# Patient Record
Sex: MALE | Race: WHITE | NOT HISPANIC OR LATINO | URBAN - METROPOLITAN AREA
[De-identification: names, ages, dates, MRNs, and addresses within clinical notes are randomized per-mention and may not be internally consistent; named-entity substitution may affect disease eponyms.]

---

## 2024-03-21 ENCOUNTER — TELEMEDICINE (OUTPATIENT)
Dept: PRIMARY CARE | Facility: CLINIC | Age: 24
End: 2024-03-21
Payer: COMMERCIAL

## 2024-03-21 DIAGNOSIS — F41.8 SITUATIONAL ANXIETY: ICD-10-CM

## 2024-03-21 DIAGNOSIS — F41.9 ANXIETY: Primary | ICD-10-CM

## 2024-03-21 DIAGNOSIS — F41.0 PANIC ATTACK: ICD-10-CM

## 2024-03-21 PROCEDURE — 99442 PR PHYS/QHP TELEPHONE EVALUATION 11-20 MIN: CPT | Performed by: INTERNAL MEDICINE

## 2024-03-21 RX ORDER — HYDROXYZINE HYDROCHLORIDE 10 MG/1
TABLET, FILM COATED ORAL
Qty: 20 TABLET | Refills: 0 | Status: SHIPPED | OUTPATIENT
Start: 2024-03-21 | End: 2024-05-06 | Stop reason: SDUPTHER

## 2024-03-21 NOTE — PROGRESS NOTES
Subjective   Patient ID: Mina Noble is a 23 y.o. male who presents for No chief complaint on file..    HPI Patient initiated telehealth visit this visit was completed via telephone secondary to the restrictions of the COVID-19 pandemic all medical issues were addressed and discussed with patient patient was not otherwise examined if it was felt that the patient needed to be seen in the office that would have been referred to do so patient verbally consented to visit  Sick visit has been working with therapist regarding situational anxiety especially flying which has been especially troublesome for him he has been finding slightly less than usual for his employer but may figure flying more than 5 times per year mostly domestic may have longer flight to [place] no chest pain no shortness of breath has had panic attack on the plane before otherwise has been doing well no chest pain no shortness of breath     past medical history Osgood-Schlatter's disease    Medications none    Allergies no known drug allergies        Review of Systems    Objective   There were no vitals taken for this visit.    Physical Exam alert and oriented x 3 breathing unlabored not otherwise examined    Assessment/Plan impression situational anxiety and panic   plan discussed with risk-benefit side effects of medications including BuSpar benzodiazepine and hydroxyzine feels that hydroxyzine is the most applicable to him he would like to try prior to using on flight but may use E Rx hydroxyzine 10 mg 1 to 2 tablets p.o. as needed 30 minutes prior to flight see EMR along with nonpharmacological methods for anxiety as well as following up with his psychologist as well as good diet regular exercise increase water consumption and recheck for next regular visit [facility] sooner as needed

## 2024-05-06 ENCOUNTER — OFFICE VISIT (OUTPATIENT)
Dept: PRIMARY CARE | Facility: CLINIC | Age: 24
End: 2024-05-06
Payer: COMMERCIAL

## 2024-05-06 VITALS — BODY MASS INDEX: 22.91 KG/M2 | WEIGHT: 154 LBS | DIASTOLIC BLOOD PRESSURE: 65 MMHG | SYSTOLIC BLOOD PRESSURE: 115 MMHG

## 2024-05-06 DIAGNOSIS — F41.9 ANXIETY: ICD-10-CM

## 2024-05-06 DIAGNOSIS — Z00.00 HEALTH CARE MAINTENANCE: Primary | ICD-10-CM

## 2024-05-06 PROCEDURE — 99395 PREV VISIT EST AGE 18-39: CPT | Performed by: INTERNAL MEDICINE

## 2024-05-06 RX ORDER — HYDROXYZINE HYDROCHLORIDE 10 MG/1
TABLET, FILM COATED ORAL
Qty: 20 TABLET | Refills: 2 | Status: SHIPPED | OUTPATIENT
Start: 2024-05-06

## 2024-05-06 NOTE — PROGRESS NOTES
Subjective   Patient ID: Mina Noble is a 23 y.o. male who presents for No chief complaint on file..    HPI CPE Sipp to differentiate no chest pain no shortness of breath no side effect with medication was doing well with the hydroxyzine on his bites he has some longer  flights coming wanted to know about increase of dosing does not feel that he needs every day medication otherwise has been doing well bones muscles joints okay exercises regularly    Past medical history noted and unchanged    Medications hydroxyzine    Allergies no known drug allergies    Social history no tobacco alcohol rare social    Family history noted and unchanged    Prevention exercises regularly    Review of Systems    Objective   There were no vitals taken for this visit.    Physical Exam vital signs noted alert and oriented x 3 NCAT PERRLA EOMI nares without discharge OP benign TM normal bilateral EAC clear bilateral no AC nodes no JVD chest clear to auscultation CV regular rate and rhythm S1-S2 without murmur gallop or rub abdomen soft nontender normal active bowel sounds LS spine normal curvature negative straight leg raise extremities no clubbing cyanosis or edema normal distal pulses DTR 2+    Assessment/Plan impression General medical examination all diagnoses anxiety situation normal  Plan okay for hydroxyzine 10 mg 1 p.o. as needed but may use 2 if needed he will try these at home prior to using him on flight and may adjust as needed good overall diet regular exercise increase water consumption did not want or need blood work at this time but may screen in the future otherwise recheck 6 months and/or as needed TT 50 cc 26

## 2024-05-08 ENCOUNTER — APPOINTMENT (OUTPATIENT)
Dept: PRIMARY CARE | Facility: CLINIC | Age: 24
End: 2024-05-08
Payer: COMMERCIAL

## 2025-05-09 DIAGNOSIS — F41.9 ANXIETY: ICD-10-CM

## 2025-05-09 RX ORDER — HYDROXYZINE HYDROCHLORIDE 10 MG/1
TABLET, FILM COATED ORAL
Qty: 20 TABLET | Refills: 0 | Status: SHIPPED | OUTPATIENT
Start: 2025-05-09